# Patient Record
Sex: MALE | URBAN - METROPOLITAN AREA
[De-identification: names, ages, dates, MRNs, and addresses within clinical notes are randomized per-mention and may not be internally consistent; named-entity substitution may affect disease eponyms.]

---

## 2023-05-18 ENCOUNTER — OFFICE VISIT (OUTPATIENT)
Facility: LOCATION | Age: 63
End: 2023-05-18
Payer: MEDICAID

## 2023-05-18 DIAGNOSIS — H16.213 EXPOSURE KERATOCONJUNCTIVITIS, BILATERAL: ICD-10-CM

## 2023-05-18 DIAGNOSIS — H50.811 DUANE'S SYNDROME, RIGHT EYE: ICD-10-CM

## 2023-05-18 DIAGNOSIS — H40.1131 PRIMARY OPEN-ANGLE GLAUCOMA BILATERAL MILD STAGE: Primary | ICD-10-CM

## 2023-05-18 PROCEDURE — 92083 EXTENDED VISUAL FIELD XM: CPT | Performed by: OPHTHALMOLOGY

## 2023-05-18 PROCEDURE — 99214 OFFICE O/P EST MOD 30 MIN: CPT | Performed by: OPHTHALMOLOGY

## 2023-05-18 ASSESSMENT — INTRAOCULAR PRESSURE
OS: 14
OD: 12

## 2023-05-18 NOTE — IMPRESSION/PLAN
Impression: Patient has had abnormal eye movements since childhood. Patient was not patched and acuity is equal.  No c/o diplopia. Plan: Continuing to monitor, patient is not visually bothered.

## 2023-05-18 NOTE — IMPRESSION/PLAN
Impression: Patient reports MS has been stable. 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 RLL plug in place, LLL plug out. Plan:
Continue ATs BID OU. Consider LLL Silicone plug next visit.

## 2023-05-18 NOTE — IMPRESSION/PLAN
Impression: 6 month f/u with HVF 24-2. POHx: POAG mild OU, Cataract OU, KATELYN (-) Ocular/head trauma. FOHx: Negative to glaucoma. PMHx: Asthma, SOB, Multiple Sclerosis on Ocrevus, Headaches, Migraines. Eye meds: Blnk ATs BID OU. TMAX: High teens OU (fluctuates). Target IOP: < 16 OU. Plan: Testing:
OCT/ONH 05/2022: OD thin IT>T. OS thin ST>IN. OU stable. HVF 24-2 05/2023: OU normal. Stable. Pachy: 502/500. Gonio 11/2022: CB 1+ 360 OU. Today:
IOP acceptable OU. HVF performed and reviewed. Discussed normal and stable findings on VF testing. Plan:
Monitor without meds. RTC in 6 months with OCT Jovan Sole 74 OU, DFE OU and gonio. Next step PGA vs SLT. Patient might have difficulty putting drops due to muscular sclerosis.